# Patient Record
Sex: MALE | Race: WHITE | NOT HISPANIC OR LATINO | Employment: OTHER | URBAN - METROPOLITAN AREA
[De-identification: names, ages, dates, MRNs, and addresses within clinical notes are randomized per-mention and may not be internally consistent; named-entity substitution may affect disease eponyms.]

---

## 2019-06-17 PROBLEM — N40.0 BPH (BENIGN PROSTATIC HYPERPLASIA): Status: ACTIVE | Noted: 2019-06-17

## 2019-06-17 PROBLEM — N41.1 PROSTATITIS, CHRONIC: Status: ACTIVE | Noted: 2019-06-17

## 2019-06-17 PROBLEM — R97.20 ELEVATED PSA: Status: ACTIVE | Noted: 2019-06-17

## 2019-12-11 PROBLEM — B34.9 VIRAL SYNDROME: Status: ACTIVE | Noted: 2019-12-11

## 2019-12-11 PROBLEM — J06.9 VIRAL URI WITH COUGH: Status: ACTIVE | Noted: 2019-12-11

## 2021-05-12 ENCOUNTER — PATIENT MESSAGE (OUTPATIENT)
Dept: RESEARCH | Facility: HOSPITAL | Age: 61
End: 2021-05-12

## 2021-06-27 PROBLEM — H54.60 MONOCULAR VISION LOSS: Status: ACTIVE | Noted: 2021-06-27

## 2021-06-28 PROBLEM — H35.361 DRUSEN OF RIGHT MACULA: Status: ACTIVE | Noted: 2021-06-28

## 2021-06-28 PROBLEM — H25.813 COMBINED FORM OF AGE-RELATED CATARACT, BOTH EYES: Status: ACTIVE | Noted: 2021-06-28

## 2021-06-28 PROBLEM — H52.7 REFRACTIVE ERROR: Status: ACTIVE | Noted: 2021-06-28

## 2021-06-28 PROBLEM — H43.811 POSTERIOR VITREOUS DETACHMENT OF RIGHT EYE: Status: ACTIVE | Noted: 2021-06-28

## 2021-07-01 ENCOUNTER — PATIENT MESSAGE (OUTPATIENT)
Dept: ADMINISTRATIVE | Facility: OTHER | Age: 61
End: 2021-07-01

## 2022-10-13 PROBLEM — N40.1 URINARY RETENTION DUE TO BENIGN PROSTATIC HYPERPLASIA: Status: ACTIVE | Noted: 2022-10-13

## 2022-10-13 PROBLEM — R33.8 URINARY RETENTION DUE TO BENIGN PROSTATIC HYPERPLASIA: Status: ACTIVE | Noted: 2022-10-13

## 2022-10-13 PROBLEM — N39.0 UTI (URINARY TRACT INFECTION): Status: ACTIVE | Noted: 2022-10-13

## 2022-10-13 PROBLEM — I49.3 PVC'S (PREMATURE VENTRICULAR CONTRACTIONS): Status: ACTIVE | Noted: 2022-10-13

## 2022-10-14 PROBLEM — N12 PYELONEPHRITIS: Status: ACTIVE | Noted: 2022-10-13

## 2022-10-15 PROBLEM — E87.6 HYPOKALEMIA: Status: ACTIVE | Noted: 2022-10-15

## 2022-10-15 PROBLEM — I49.3 PVC'S (PREMATURE VENTRICULAR CONTRACTIONS): Status: RESOLVED | Noted: 2022-10-13 | Resolved: 2022-10-15

## 2023-01-16 PROBLEM — N12 PYELONEPHRITIS: Status: RESOLVED | Noted: 2022-10-13 | Resolved: 2023-01-16

## 2023-03-08 ENCOUNTER — PES CALL (OUTPATIENT)
Dept: ADMINISTRATIVE | Facility: CLINIC | Age: 63
End: 2023-03-08

## 2023-11-26 PROBLEM — M51.36 DEGENERATIVE DISC DISEASE, LUMBAR: Status: ACTIVE | Noted: 2023-11-26

## 2023-11-26 PROBLEM — M54.50 LOW BACK PAIN RADIATING TO BOTH LEGS: Status: ACTIVE | Noted: 2023-11-26

## 2023-11-26 PROBLEM — M51.369 DEGENERATIVE DISC DISEASE, LUMBAR: Status: ACTIVE | Noted: 2023-11-26

## 2023-11-26 PROBLEM — M79.604 LOW BACK PAIN RADIATING TO BOTH LEGS: Status: ACTIVE | Noted: 2023-11-26

## 2023-11-26 PROBLEM — M79.605 LOW BACK PAIN RADIATING TO BOTH LEGS: Status: ACTIVE | Noted: 2023-11-26

## 2024-01-30 DIAGNOSIS — Z00.00 ENCOUNTER FOR MEDICARE ANNUAL WELLNESS EXAM: ICD-10-CM

## 2024-03-13 PROBLEM — N39.0 SEPSIS DUE TO GRAM-NEGATIVE UTI: Status: ACTIVE | Noted: 2024-03-13

## 2024-03-13 PROBLEM — N10 ACUTE PYELONEPHRITIS: Status: ACTIVE | Noted: 2024-03-13

## 2024-03-13 PROBLEM — N42.81 PROSTADYNIA: Status: ACTIVE | Noted: 2024-03-13

## 2024-03-13 PROBLEM — N39.0 E. COLI URINARY TRACT INFECTION: Status: ACTIVE | Noted: 2019-09-18

## 2024-03-13 PROBLEM — K11.8 PAROTID MASS: Status: ACTIVE | Noted: 2017-01-15

## 2024-03-13 PROBLEM — B96.20 E. COLI URINARY TRACT INFECTION: Status: ACTIVE | Noted: 2019-09-18

## 2024-03-13 PROBLEM — A41.50 SEPSIS DUE TO GRAM-NEGATIVE UTI: Status: ACTIVE | Noted: 2024-03-13

## 2024-03-14 PROBLEM — N30.00 ACUTE CYSTITIS: Status: ACTIVE | Noted: 2024-03-13

## 2024-03-14 PROBLEM — R07.9 CHEST PAIN: Status: ACTIVE | Noted: 2024-03-14

## 2024-03-15 PROBLEM — R07.9 CHEST PAIN: Status: RESOLVED | Noted: 2024-03-14 | Resolved: 2024-03-15

## 2024-03-15 PROBLEM — A41.50 SEPSIS DUE TO GRAM-NEGATIVE UTI: Status: RESOLVED | Noted: 2024-03-13 | Resolved: 2024-03-15

## 2024-03-15 PROBLEM — N39.0 SEPSIS DUE TO GRAM-NEGATIVE UTI: Status: RESOLVED | Noted: 2024-03-13 | Resolved: 2024-03-15

## 2024-03-18 ENCOUNTER — PATIENT MESSAGE (OUTPATIENT)
Dept: ADMINISTRATIVE | Facility: CLINIC | Age: 64
End: 2024-03-18

## 2024-03-18 ENCOUNTER — PATIENT OUTREACH (OUTPATIENT)
Dept: ADMINISTRATIVE | Facility: CLINIC | Age: 64
End: 2024-03-18

## 2024-03-18 NOTE — PROGRESS NOTES
C3 nurse attempted to contact Kane Casey Jr. for a TCC post hospital discharge follow up call. No answer, left voicemail with callback information.     The patient does not have a scheduled HOSFU appointment with a PCP within the next 5-7 days post discharge date of 3/15/24. No messages routed at this time.

## 2024-03-18 NOTE — PROGRESS NOTES
C3 nurse spoke with Kane Casey Jr. for a TCC post hospital discharge follow up call. Pt states since returning home he has had a intermittent fever (fever broke on Saturday), and worsening urethral itching and burning when he urinates. He denies need for transfer to the [facility] number, stating he feels comfortable following up with his Urologist tomorrow. LEONARDO lo gave the pt the [facility] number to call with any new symptoms or complaints and he wrote it down.    The patient does not have a scheduled HOSFU appointment with a PCP within 5-7 days post hospital discharge date of 3/15/24. C3 nurse was unable to schedule HOSFU appointment in [facility] with his PCP or a [facility]. Pt stated he used to see a physician in [place] for his PCP and would like to followup with him- but is unsure of his name. C3 nurse provided the [facility] Scheduling department number and the pt verbalized understanding to call and establish a new PCP. Unable to route any messages at this time.

## 2024-04-08 PROBLEM — N30.90 CYSTITIS: Status: ACTIVE | Noted: 2024-04-08

## 2024-04-08 PROBLEM — N39.0 URINARY TRACT INFECTION WITHOUT HEMATURIA: Status: ACTIVE | Noted: 2024-04-08

## 2024-04-08 PROBLEM — N41.0 ACUTE ON CHRONIC PROSTATITIS: Status: ACTIVE | Noted: 2019-06-17

## 2024-09-18 PROBLEM — K86.89 PANCREATIC MASS: Status: ACTIVE | Noted: 2024-09-18

## 2024-10-15 ENCOUNTER — PATIENT MESSAGE (OUTPATIENT)
Dept: RESEARCH | Facility: HOSPITAL | Age: 64
End: 2024-10-15

## 2024-10-24 ENCOUNTER — PATIENT MESSAGE (OUTPATIENT)
Dept: RESEARCH | Facility: HOSPITAL | Age: 64
End: 2024-10-24

## 2025-01-16 PROBLEM — A41.9 SEPSIS: Status: ACTIVE | Noted: 2025-01-16

## 2025-01-16 PROBLEM — R00.0 SINUS TACHYCARDIA: Status: ACTIVE | Noted: 2025-01-16

## 2025-01-16 PROBLEM — R65.10 SIRS (SYSTEMIC INFLAMMATORY RESPONSE SYNDROME): Status: ACTIVE | Noted: 2025-01-16

## 2025-01-23 ENCOUNTER — PATIENT OUTREACH (OUTPATIENT)
Dept: ADMINISTRATIVE | Facility: CLINIC | Age: 65
End: 2025-01-23

## 2025-01-23 DIAGNOSIS — A41.9 SEPSIS, DUE TO UNSPECIFIED ORGANISM, UNSPECIFIED WHETHER ACUTE ORGAN DYSFUNCTION PRESENT: Primary | ICD-10-CM

## 2025-01-23 NOTE — PROGRESS NOTES
C3 nurse spoke with Kane Casey Jr. for a TCC post hospital discharge follow up call.The pt does not have a PCP; NP out of area

## 2025-01-28 PROBLEM — K02.9 CARIES: Status: ACTIVE | Noted: 2025-01-28

## 2025-01-29 ENCOUNTER — PATIENT OUTREACH (OUTPATIENT)
Dept: ADMINISTRATIVE | Facility: OTHER | Age: 65
End: 2025-01-29

## 2025-01-29 NOTE — PROGRESS NOTES
CHW - Outreach Attempt    Community Health Worker left a voicemail message for 1st attempt to contact patient regarding: Pt not able to see PCP for follow up care; also financial assistance needed for Rx's and food     Community Health Worker to attempt to contact patient on: 1/29/25

## 2025-02-03 NOTE — PROGRESS NOTES
CHW - Initial Contact    This Community Health Worker verified only  the Social Determinant of Health questionnaire with patient via telephone today.      Pt identified barriers of most importance are:     Food: Patient states his main concern is affording food, pt states he can't remember is he's applied for SNAP benefits in the past but currently pays out of pocket for groceries also patient stated he's concerned that if he applied and is approved for SNAP benefits his health coverage will changed      House has roof damage: Patient states he inherits a house from his late mother patient states the home needs a new roof and theres a leak in the home which caused mold to grow in a spare room    Patient doesn't have any money for repairs     Support and Services: CHW provided the [facility] Patient Accounts Customer Service   To contact us by telephone:  Phone: [phone]    And will mail food resources & snap department phone number and any grants or programs for home repairs    Follow up required: yes    Follow-up Outreach - Due: 2/10/2025

## 2025-02-12 ENCOUNTER — PATIENT OUTREACH (OUTPATIENT)
Dept: ADMINISTRATIVE | Facility: OTHER | Age: 65
End: 2025-02-12

## 2025-02-12 NOTE — PROGRESS NOTES
CHW - Case Closure    This Community Health Worker spoke to patient via telephone today.     Pt/Caregiver reported: resources received that CHW mailed    Pt/Caregiver denied any additional needs at this time and agrees with episode closure at this time.  Provided patient with Community Health Worker's contact information and encouraged him/her to contact this Community Health Worker if additional needs arise.

## 2025-07-30 DIAGNOSIS — U07.1 COVID-19 VIRUS DETECTED: ICD-10-CM
